# Patient Record
Sex: MALE | Employment: FULL TIME | URBAN - METROPOLITAN AREA
[De-identification: names, ages, dates, MRNs, and addresses within clinical notes are randomized per-mention and may not be internally consistent; named-entity substitution may affect disease eponyms.]

---
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
## 2022-08-28 ENCOUNTER — HEALTH MAINTENANCE LETTER (OUTPATIENT)
Age: 22
End: 2022-08-28

## 2023-01-14 ENCOUNTER — HEALTH MAINTENANCE LETTER (OUTPATIENT)
Age: 23
End: 2023-01-14

## 2023-09-24 ENCOUNTER — HEALTH MAINTENANCE LETTER (OUTPATIENT)
Age: 23
End: 2023-09-24

## 2024-12-26 ENCOUNTER — LAB REQUISITION (OUTPATIENT)
Dept: LAB | Facility: CLINIC | Age: 24
End: 2024-12-26
Payer: COMMERCIAL

## 2024-12-26 DIAGNOSIS — T78.1XXA OTHER ADVERSE FOOD REACTIONS, NOT ELSEWHERE CLASSIFIED, INITIAL ENCOUNTER: ICD-10-CM

## 2024-12-26 LAB — HOLD SPECIMEN: NORMAL

## 2024-12-26 PROCEDURE — 86003 ALLG SPEC IGE CRUDE XTRC EA: CPT | Mod: ORL | Performed by: STUDENT IN AN ORGANIZED HEALTH CARE EDUCATION/TRAINING PROGRAM

## 2024-12-26 PROCEDURE — 86008 ALLG SPEC IGE RECOMB EA: CPT | Mod: ORL | Performed by: STUDENT IN AN ORGANIZED HEALTH CARE EDUCATION/TRAINING PROGRAM

## 2024-12-26 PROCEDURE — 82785 ASSAY OF IGE: CPT | Mod: ORL | Performed by: STUDENT IN AN ORGANIZED HEALTH CARE EDUCATION/TRAINING PROGRAM

## 2024-12-27 LAB
ALMOND IGE QN: 0.32 KU(A)/L
BRAZIL NUT IGE QN: 0.15 KU(A)/L
CASHEW NUT IGE QN: 0.45 KU(A)/L
IGE SERPL-ACNC: 156 KU/L (ref 0–114)
PEANUT (RARA H) 1 IGE QN: 6.77 KU(A)/L
PEANUT (RARA H) 2 IGE QN: 26.2 KU(A)/L
PEANUT (RARA H) 3 IGE QN: 16.6 KU(A)/L
PEANUT (RARA H) 6 IGE QN: 17.5 KU(A)/L
PEANUT (RARA H) 8 IGE QN: 1.56 KU(A)/L
PEANUT (RARA H) 9 IGE QN: <0.1 KU(A)/L

## 2025-01-02 LAB
PEANUT IGE QN: 48.1 KU(A)/L
PECAN/HICK NUT IGE QN: <0.1 KU(A)/L
WALNUT IGE QN: 0.17 KU(A)/L